# Patient Record
Sex: FEMALE | Race: WHITE | Employment: OTHER | URBAN - METROPOLITAN AREA
[De-identification: names, ages, dates, MRNs, and addresses within clinical notes are randomized per-mention and may not be internally consistent; named-entity substitution may affect disease eponyms.]

---

## 2019-08-10 ENCOUNTER — TRANSFERRED RECORDS (OUTPATIENT)
Dept: HEALTH INFORMATION MANAGEMENT | Facility: CLINIC | Age: 49
End: 2019-08-10

## 2019-08-10 ENCOUNTER — HOSPITAL ENCOUNTER (INPATIENT)
Facility: HOSPITAL | Age: 49
LOS: 4 days | Discharge: HOME | DRG: 885 | End: 2019-08-14
Attending: PSYCHIATRY & NEUROLOGY | Admitting: PSYCHIATRY & NEUROLOGY
Payer: COMMERCIAL

## 2019-08-10 DIAGNOSIS — F20.81 SCHIZOPHRENIFORM DISORDER (H): Primary | ICD-10-CM

## 2019-08-10 PROBLEM — F29 PSYCHOSIS (H): Status: ACTIVE | Noted: 2019-08-10

## 2019-08-10 LAB
ALT SERPL-CCNC: 25 IU/L (ref 6–31)
AST SERPL-CCNC: 28 IU/L (ref 10–40)
CREAT SERPL-MCNC: 0.79 MG/DL (ref 0.4–1)
GFR SERPL CREATININE-BSD FRML MDRD: >60 ML/MIN/1.73M2
GLUCOSE SERPL-MCNC: 92 MG/DL (ref 70–99)
POTASSIUM SERPL-SCNC: 4.4 MEQ/L (ref 3.4–5.1)
TSH SERPL-ACNC: 1.67 UIU/ML (ref 0.4–3.99)

## 2019-08-10 PROCEDURE — 12400000 ZZH R&B MH

## 2019-08-10 PROCEDURE — 25000132 ZZH RX MED GY IP 250 OP 250 PS 637: Performed by: NURSE PRACTITIONER

## 2019-08-10 RX ORDER — TRAZODONE HYDROCHLORIDE 50 MG/1
50 TABLET, FILM COATED ORAL
Status: DISCONTINUED | OUTPATIENT
Start: 2019-08-10 | End: 2019-08-14

## 2019-08-10 RX ORDER — ACETAMINOPHEN 325 MG/1
650 TABLET ORAL EVERY 4 HOURS PRN
Status: DISCONTINUED | OUTPATIENT
Start: 2019-08-10 | End: 2019-08-14 | Stop reason: HOSPADM

## 2019-08-10 RX ORDER — OLANZAPINE 10 MG/1
10 TABLET, ORALLY DISINTEGRATING ORAL 2 TIMES DAILY PRN
Status: DISCONTINUED | OUTPATIENT
Start: 2019-08-10 | End: 2019-08-11

## 2019-08-10 RX ORDER — HYDROXYZINE HYDROCHLORIDE 10 MG/1
30-50 TABLET, FILM COATED ORAL EVERY 4 HOURS PRN
Status: DISCONTINUED | OUTPATIENT
Start: 2019-08-10 | End: 2019-08-14 | Stop reason: HOSPADM

## 2019-08-10 RX ADMIN — OLANZAPINE 10 MG: 10 TABLET, ORALLY DISINTEGRATING ORAL at 22:26

## 2019-08-10 ASSESSMENT — ACTIVITIES OF DAILY LIVING (ADL)
COGNITION: 0 - NO COGNITION ISSUES REPORTED
FALL_HISTORY_WITHIN_LAST_SIX_MONTHS: NO
SWALLOWING: 0-->SWALLOWS FOODS/LIQUIDS WITHOUT DIFFICULTY
TRANSFERRING: 0-->INDEPENDENT
RETIRED_COMMUNICATION: 0-->UNDERSTANDS/COMMUNICATES WITHOUT DIFFICULTY
AMBULATION: 0-->INDEPENDENT
DRESS: 0-->INDEPENDENT
BATHING: 0-->INDEPENDENT
RETIRED_EATING: 0-->INDEPENDENT
TOILETING: 0-->INDEPENDENT

## 2019-08-11 PROCEDURE — 12400000 ZZH R&B MH

## 2019-08-11 PROCEDURE — 99223 1ST HOSP IP/OBS HIGH 75: CPT | Performed by: NURSE PRACTITIONER

## 2019-08-11 PROCEDURE — 25000132 ZZH RX MED GY IP 250 OP 250 PS 637: Performed by: NURSE PRACTITIONER

## 2019-08-11 RX ORDER — METHADONE HYDROCHLORIDE 5 MG/1
10 TABLET ORAL
Status: DISCONTINUED | OUTPATIENT
Start: 2019-08-11 | End: 2019-08-12 | Stop reason: ALTCHOICE

## 2019-08-11 RX ORDER — NALOXONE HYDROCHLORIDE 0.4 MG/ML
.1-.4 INJECTION, SOLUTION INTRAMUSCULAR; INTRAVENOUS; SUBCUTANEOUS
Status: DISCONTINUED | OUTPATIENT
Start: 2019-08-11 | End: 2019-08-14 | Stop reason: HOSPADM

## 2019-08-11 RX ORDER — OLANZAPINE 5 MG/1
5 TABLET, ORALLY DISINTEGRATING ORAL AT BEDTIME
Status: DISCONTINUED | OUTPATIENT
Start: 2019-08-11 | End: 2019-08-14

## 2019-08-11 RX ORDER — METHADONE HYDROCHLORIDE 5 MG/1
15 TABLET ORAL DAILY
Status: DISCONTINUED | OUTPATIENT
Start: 2019-08-11 | End: 2019-08-12 | Stop reason: ALTCHOICE

## 2019-08-11 RX ORDER — GABAPENTIN 600 MG/1
600 TABLET ORAL 3 TIMES DAILY
Status: DISCONTINUED | OUTPATIENT
Start: 2019-08-11 | End: 2019-08-13

## 2019-08-11 RX ORDER — METHADONE HYDROCHLORIDE 5 MG/1
10 TABLET ORAL EVERY 12 HOURS SCHEDULED
Status: DISCONTINUED | OUTPATIENT
Start: 2019-08-14 | End: 2019-08-12 | Stop reason: ALTCHOICE

## 2019-08-11 RX ADMIN — OLANZAPINE 5 MG: 5 TABLET, ORALLY DISINTEGRATING ORAL at 20:26

## 2019-08-11 RX ADMIN — GABAPENTIN 600 MG: 600 TABLET, FILM COATED ORAL at 13:47

## 2019-08-11 RX ADMIN — GABAPENTIN 600 MG: 600 TABLET, FILM COATED ORAL at 09:57

## 2019-08-11 RX ADMIN — GABAPENTIN 600 MG: 600 TABLET, FILM COATED ORAL at 20:27

## 2019-08-11 RX ADMIN — METHADONE HYDROCHLORIDE 10 MG: 5 TABLET ORAL at 20:26

## 2019-08-11 RX ADMIN — METHADONE HYDROCHLORIDE 15 MG: 5 TABLET ORAL at 09:57

## 2019-08-11 RX ADMIN — TRAZODONE HYDROCHLORIDE 50 MG: 50 TABLET ORAL at 20:30

## 2019-08-11 ASSESSMENT — ACTIVITIES OF DAILY LIVING (ADL)
DRESS: SCRUBS (BEHAVIORAL HEALTH)
ORAL_HYGIENE: INDEPENDENT
HYGIENE/GROOMING: INDEPENDENT
HYGIENE/GROOMING: INDEPENDENT
LAUNDRY: UNABLE TO COMPLETE
DRESS: SCRUBS (BEHAVIORAL HEALTH)
ORAL_HYGIENE: INDEPENDENT
LAUNDRY: UNABLE TO COMPLETE

## 2019-08-11 NOTE — PROGRESS NOTES
08/10/19 2252   Patient Belongings   Did you bring any home meds/supplements to the hospital?  No   Patient Belongings locker;sent to security per site process   Patient Belongings Put in Hospital Secure Location (Security or Locker, etc.) cash/credit card;clothing;jewelry;shoes   Belongings Search Yes   Clothing Search Yes   Second Staff jose   Comment $8 cash, 3 hair ties, pink hat, green bra, white shirt, jeans, grey hodded shirt, underwear, sunglasses, earrings (wearing), shoes, silver colored necklace, silver solored anklet   List items sent to safe: none    All other belongings put in assigned cubby in belongings room.     I have reviewed my belongings list on admission and verify that it is correct.     Patient signature_______________________________    Second staff witness (if patient unable to sign) ______________________________       I have received all my belongings at discharge.    Patient signature________________________________    Lisa  8/10/2019  10:54 PM
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
## 2019-08-11 NOTE — PLAN OF CARE
"  Problem: Adult Behavioral Health Plan of Care  Goal: Patient-Specific Goal (Individualization)  Description  1. Pt will compliant with treatment plan.   2. Pt will take meds as prescribed.  3. Pt will attend 50% of groups.     Patient isolative and withdrawn to her bed. Affect is full range, mood is calm. Admits to pain 10/10 in her back, states it is due to her RA, and that her medications she received this morning should help. Has been appropriate with staff and peers.   1230-patient hesitantly signed Voluntary Consent for treatment form, renée, states \"I just want to go home and be in my own bed and with my animals\". States she is here because her mom \"blew the things I said out of proportion, you know how mom's can be. I took care of her, she was in [employer] all of my senior year, and I had to take care of my brother and sister, and plan my sister's graduation party. She was in [employer] [facility] before, too. My mom and my sister are the crazy ones, my brother and I like to say that as a joke. I don't want to stay. These beds are uncomfortable, I usually sleep like a 'U' at home, they are hard on my back. That lady came in and said that I pull my hair out, I don't do that, it's just falling out in clumps when I comb through it. I'm so self conscious of my teeth, I just want to be home in my own bed and couch. And that carpet, I was just removing it slowly because it is under furniture and stuff, and look at my size, I can't just move things, so taking it out in pieces is easier\".    Face to face end of shift report communicated to Marixa WALKER RN.     Cherelle Duke  8/11/2019  3:14 PM          Outcome: No Change     Problem: Thought Process Alteration  Goal: Optimal Thought Clarity  Description  Pt will not be delusional by discharge.  She will be able to have a reality based conversation.       Patient has not made any delusional or paranoid statements.   Outcome: Improving     "

## 2019-08-11 NOTE — PLAN OF CARE
"ADMISSION NOTE    Reason for admission:  Patient unable to care for self \"they all think I'm crazy\"  Safety concerns :  Patient continues to pick at skin, states this has always been an issue..  Risk for or history of violence:  Patient has no current issues at this time.   Full skin assessment: minimal open reddened areas on hands and fingers from picking. No other red or open areas noted, patient has one tattoo on lower mid back, multiple scars from her many hip/back surgeries.       Patient arrived on unit from [facility] in [place] accompanied by security and transport staff on 8/10/2019  09:59 PM.   Status on arrival: calm, pleasant, cooperative  /81   Pulse 109   Temp 97.9  F (36.6  C) (Tympanic)   Resp 16   SpO2 100%   Patient given tour of unit and Welcome to  unit papers given to patient, wanding completed, belongings inventoried, and admission assessment completed.   Patient's legal status on arrival is transport hold. Appropriate legal rights discussed with and copy given to patient. Patient Bill of Rights discussed with and copy given to patient.   Patient denies SI, HI, and thoughts of self harm and contracts for safety while on unit.      Nohemy PRINCE Cameron  8/10/2019  11:06 PM    Patient very pleasant to talk with.  Reports that she has had multiple tests blood and stool to try and figure out the problem she is having \"It goes back a few years ago when I came home there was little black pieces, like pepper, all over the sink area in the kitchen, but I knew it wasn't pepper because they were moving.  I've taken multiple pictures and videos and no one believes me.  I believe it's a parasite of some kind from fecal matter transferred from my cat or dog.  I am the primary care taker of my animals and maybe since I have this condition where I pick my skin and some of the areas are open maybe that's how I contracted this, but nobody believes me that it's something.  My " "dog even has one coming out of the corner of his eye.  I suffer from RA really bad that is why I do not have my 2 front teeth, I always tell people this right away so they don't  me, I do not use drugs\"  \"I am in constant pain, but my Humira is expensive and the insurance needs to approve it.\"  Patient reports to having 23 surgeries, also that she smokes but not enough to need a patch or anything.      PRN:  Zyprexa 10 mg admin @ 2226 for anxiety.              "

## 2019-08-11 NOTE — PLAN OF CARE
Observed pt lying in a supine position - eyes closed - non-labored breathing noted.  Some restlessnesss noted with feet. Voices no complaints on first round.

## 2019-08-12 PROCEDURE — 25000132 ZZH RX MED GY IP 250 OP 250 PS 637: Performed by: NURSE PRACTITIONER

## 2019-08-12 PROCEDURE — 99232 SBSQ HOSP IP/OBS MODERATE 35: CPT | Performed by: NURSE PRACTITIONER

## 2019-08-12 PROCEDURE — 12400000 ZZH R&B MH

## 2019-08-12 RX ORDER — METHADONE HYDROCHLORIDE 5 MG/1
10 TABLET ORAL 3 TIMES DAILY
Status: DISCONTINUED | OUTPATIENT
Start: 2019-08-12 | End: 2019-08-12 | Stop reason: ALTCHOICE

## 2019-08-12 RX ORDER — METHADONE HYDROCHLORIDE 5 MG/1
15 TABLET ORAL DAILY
Status: COMPLETED | OUTPATIENT
Start: 2019-08-13 | End: 2019-08-13

## 2019-08-12 RX ORDER — METHADONE HYDROCHLORIDE 10 MG/1
10 TABLET ORAL 3 TIMES DAILY
Status: ON HOLD | COMMUNITY
End: 2019-08-14

## 2019-08-12 RX ORDER — METHADONE HYDROCHLORIDE 5 MG/1
10 TABLET ORAL 2 TIMES DAILY
Status: DISCONTINUED | OUTPATIENT
Start: 2019-08-14 | End: 2019-08-14 | Stop reason: HOSPADM

## 2019-08-12 RX ORDER — METHADONE HYDROCHLORIDE 5 MG/1
10 TABLET ORAL AT BEDTIME
Status: COMPLETED | OUTPATIENT
Start: 2019-08-12 | End: 2019-08-13

## 2019-08-12 RX ORDER — RISPERIDONE 0.5 MG/1
0.5 TABLET ORAL 2 TIMES DAILY
Status: ON HOLD | COMMUNITY
End: 2019-08-14

## 2019-08-12 RX ADMIN — TRAZODONE HYDROCHLORIDE 50 MG: 50 TABLET ORAL at 20:21

## 2019-08-12 RX ADMIN — METHADONE HYDROCHLORIDE 10 MG: 5 TABLET ORAL at 20:17

## 2019-08-12 RX ADMIN — GABAPENTIN 600 MG: 600 TABLET, FILM COATED ORAL at 13:41

## 2019-08-12 RX ADMIN — GABAPENTIN 600 MG: 600 TABLET, FILM COATED ORAL at 08:41

## 2019-08-12 RX ADMIN — METHADONE HYDROCHLORIDE 10 MG: 5 TABLET ORAL at 13:41

## 2019-08-12 RX ADMIN — METHADONE HYDROCHLORIDE 15 MG: 5 TABLET ORAL at 08:41

## 2019-08-12 RX ADMIN — GABAPENTIN 600 MG: 600 TABLET, FILM COATED ORAL at 20:18

## 2019-08-12 RX ADMIN — OLANZAPINE 5 MG: 5 TABLET, ORALLY DISINTEGRATING ORAL at 20:18

## 2019-08-12 ASSESSMENT — ACTIVITIES OF DAILY LIVING (ADL)
HYGIENE/GROOMING: INDEPENDENT
HYGIENE/GROOMING: INDEPENDENT
ORAL_HYGIENE: INDEPENDENT
DRESS: SCRUBS (BEHAVIORAL HEALTH);INDEPENDENT

## 2019-08-12 NOTE — PLAN OF CARE
Face to face end of shift will be report communicated to oncoming RN.      Problem: Adult Behavioral Health Plan of Care  Goal: Patient-Specific Goal (Individualization)  Description  1. Pt will compliant with treatment plan.   2. Pt will take meds as prescribed.  3. Pt will attend 50% of groups.     8/12/2019 0038 by Alison Santillan RN  Outcome: No Change     Problem: Thought Process Alteration  Goal: Optimal Thought Clarity  Description  Pt will not be delusional by discharge.  She will be able to have a reality based conversation.     8/12/2019 0038 by Alison Santillan, RN  Outcome: No Change   Pt appears to be sleeping in bed with eyes closed, having regular respirations and position changes. 15 minutes and PRN safety checks completed with no noted complains. Will continue to monitor.

## 2019-08-12 NOTE — PLAN OF CARE
"  Problem: Adult Behavioral Health Plan of Care  Goal: Patient-Specific Goal (Individualization)  Description  1. Pt will compliant with treatment plan.   2. Pt will take meds as prescribed.  3. Pt will attend 50% of groups.     8/11/2019 2213 by Marixa Ortega, RN  Outcome: No Change   Denies being suicidal.  Anxiety 6/10, denies depression, when asked about pain, states, \"yes always\".  Has a lot of things going on in her life right now, foreclosure of her home, missing front teeth, \"it's going to cost me $15,000 to get my teeth fix, and I use to have such a nice teeth\". Tearful at times.    Did ramble on about the different type of bugs her dog has had and the different ones that are in her house. Right now she said, \"we are dealing with carpet beetles\" \"I'm tearing out small pieces at a time because I've had so many back surgeries that I'm not strong enough to pull it all at once\".  \" works all day and is to tired to do it when he gets off\".   Isolates in room, does not attend groups. Does not socialize with peers.  Eating with no problems. Has difficulty sleeping on the beds here because of all the rods, plates and screws in her back.    Takes medications as prescribed.  Requested and received Trazodone 50 mg po at 2030.    Face to face end of shift report communicated to [facility] night shift RN.     Marixa Otrega  8/11/2019  11:20 PM        "

## 2019-08-12 NOTE — PLAN OF CARE
Problem: Adult Behavioral Health Plan of Care  Goal: Patient-Specific Goal (Individualization)  Description  1. Pt will compliant with treatment plan.   2. Pt will take meds as prescribed.  3. Pt will attend 50% of groups.     8/12/2019 1637 by Francine Schultz, RN  Outcome: No Change     Pt denies all criteria including SI, SIB, HI or hallucinations. Endorses depression and moderate anxiety. States she has had many stressors and losses in the last year including loss of her yellow lab-pt becomes tearful talking about him and a tree she planted.   Also states she is trying to get a three story house ready for sale. States she is ripping up carpeting in one room as if has carpet beetles (told to her by pest control). Pt states she notice what she thought was pepper in her sink. (Her sister had been staying there and she is not a clean person.) states she has an eros on her phone that magnifies things also uses contrast to see live things. She saw bugs. Also states her current dog is getting a recurrent eye infection. She looked at it with her phone eros and saw it was a bug.  Also states she is unsure if she and her  will get back together.    States she knows her mom is worried about her but feels she is not as depressed as her mom is making her to be.   Pt is pleasant and talkative during assessment.   Problem: Thought Process Alteration  Goal: Optimal Thought Clarity  Description  Pt will not be delusional by discharge.  She will be able to have a reality based conversation.     8/12/2019 1637 by Francine Schultz, RN  Outcome: No Change    Face to face end of shift report communicated to oncoming shift during end of shift report.     Francine Schultz  8/12/2019  10:28 PM

## 2019-08-12 NOTE — DISCHARGE INSTRUCTIONS
Behavioral Discharge Planning and Instructions    Summary: Thought Process Alteration    Main Diagnosis: delusions of parasitosis    Major Treatments, Procedures and Findings:Stabilize with medications, connect with community programs.    Symptoms to Report: feeling more aggressive, increased confusion, losing more sleep, mood getting worse or thoughts of suicide    Lifestyle Adjustment: Take all medications as prescribed, meet with doctor/ medication provider as needed. Abstain from alcohol or any unprescribed drugs.    Psychiatry Follow-up:   [facility] - Dr. Costa Rodriguez - is booked until October - will do follow up with someone in his care team -   Waleska Zee NP - Wednesday August 21 @ 2:15  [address] [place]  [address]  Phone - [phone]  Fax - [phone]    Resources:   Crisis Intervention: 603.970.5793 or 833-051-6168 (TTY: 364.170.6136).  Call anytime for help.  National [place] on Mental Illness (www.[place].nader.org): 661.773.7214 or 545-439-5689.  Alcoholics Anonymous (www.alcoholics-anonymous.org): Check your phone book for your local chapter.  Suicide Awareness Voices of Education (SAVE) (www.save.org): 034-070-YXUW (7457)  National Suicide Prevention Line (www.mentalhealthmn.org): 163-774-LTYY (9314)  Mental Health Consumer/Survivor Network of [place] (www.mhcsn.net): 709.300.8198 or 855-897-3404  Mental Health Association of [place] (www.mentalhealth.org): 924.972.3878 or 067-071-3193    General Medication Instructions:   See your medication sheet(s) for instructions.   Take all medicines as directed.  Make no changes unless your doctor suggests them.   Go to all your doctor visits.  Be sure to have all your required lab tests. This way, your medicines can be refilled on time.  Do not use any drugs not prescribed by your doctor.  Avoid alcohol.      [place] Area:  [facility], Crisis stabilization [facility]- 934.193.2982  [facility] Crisis Line: 1-481.362.8140  Advocates For Family Peace:  "153-6719  Sexual Assault Program of [facility]: 733.714.3828 or 1-498.898.1269  [place] [facility] Women[facility] Program: 1-348.830.1224 Ext: 279       Calls answered Mon-Fri-8:00 am--4:30 pm    [place]:  Advocates for Family Peace: 8-274-033-9533  [facility] - 1-810.362.5569  [facility] first call for help: 7-161-315-2415  [facility]:  (763) 406-4534      [place] Area:  Warm Line: 1-399.802.5526       Calls answered Tuesday--Saturday 4:00 pm--10:00 pm  [place] Crisis Line - 200.573.2672  [place] [facility] 510-064-8579    [place] Statewide:  [place] Crisis and Referral Services: 1-452.140.8757  National Suicide Prevention Lifeline: 2-462-209-TALK (1872)   - jrt9rciu- Text \"Life\" to 45683  First Call for Help: 2-1-1  [facility] Helpline- 1-791-SMLL-HELP    "

## 2019-08-12 NOTE — PROGRESS NOTES
CLINICAL NUTRITION SERVICES  -  ASSESSMENT NOTE    Ria Tejada : Admission Nutrition Risk Screen - reduced oral intake    49 yof admitted for psychosis. Limited medical hx available. No weight hx available, and no weight or height documented for this admission. Pt reported a 40lb weight loss, unable to confirm. Noted pt is missing some teeth, but is eating without difficulty.    Diet Order: Regular  Intake:0-100% of 4 meals documented. 2 at 0% and 2 at 100%    Height: Data Unavailable  Weight: 0 lbs 0 oz  There is no height or weight on file to calculate BMI.  Weight Status:  Unable to determine  IBW: unable to determine  Weight History: none available    Estimated Energy and Protein Needs: unable to calculate without height and weight    Malnutrition Diagnosis: Unable to determine due to limited information available    NUTRITION RECOMMENDATIONS  - Measure and document current height and weight  - Encourage intake  - May need to consider nutrition supplements with poor intake    MONITORING AND EVALUATION:  RD will monitor intake, weight, labs
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
## 2019-08-12 NOTE — PLAN OF CARE
BEHAVIORAL TEAM DISCUSSION    Participants: Isaac Blackwell NP, Brinda Leger LICSW, Sunitha Downing LSW,  Shannan Perry LSW, Macey Salamanca RN  Italo Rivers RN, Missy Ulloa RN,  Carolina Isidro OT, Cherry Enrique OT, Maria Elena Mccullough OT   Progress: Denies being suicidal.   Continued Stay Criteria/Rationale: Endorses anxiety. Psychosis.   Medical/Physical: Chronic back pain.   Precautions:   Falls precaution?: No  Behavioral Orders   Procedures    Code 1 - Restrict to Unit    Routine Programming     As clinically indicated    Status 15     Every 15 minutes.     Plan: Pharmacy was consulted on methadone taper, will clarify prior methadone order.  Questioning paracytosis.   Rationale for change in precautions or plan: none    Current Facility-Administered Medications:     acetaminophen (TYLENOL) tablet 650 mg, 650 mg, Oral, Q4H PRN, Keila Reddy APRN CNP    gabapentin (NEURONTIN) tablet 600 mg, 600 mg, Oral, TID, Keila Reddy APRN CNP, 600 mg at 08/12/19 0841    hydrOXYzine (ATARAX) tablet 30-50 mg, 30-50 mg, Oral, Q4H PRN, Keila Reddy APRN CNP    methadone (DOLOPHINE) tablet 10 mg, 10 mg, Oral, Daily at 8 pm, Keila Reddy APRN CNP, 10 mg at 08/11/19 2026    [START ON 8/14/2019] methadone (DOLOPHINE) tablet 10 mg, 10 mg, Oral, Q12H JOSÉ ANTONIO, Keila Reddy APRN CNP    methadone (DOLOPHINE) tablet 15 mg, 15 mg, Oral, Daily, Keila Reddy APRN CNP, 15 mg at 08/12/19 0841    naloxone (NARCAN) injection 0.1-0.4 mg, 0.1-0.4 mg, Intravenous, Q2 Min PRN, Keila Reddy APRN CNP    OLANZapine zydis (zyPREXA) ODT tab 5 mg, 5 mg, Oral, At Bedtime, Keila Reddy APRN CNP, 5 mg at 08/11/19 2026    traZODone (DESYREL) tablet 50 mg, 50 mg, Oral, At Bedtime PRN, Keila Reddy APRN CNP, 50 mg at 08/11/19 2030   Active Problems:    Psychosis (H)

## 2019-08-12 NOTE — PLAN OF CARE
Face to face end of shift report obtained from SUZANNA Calvin. Pt observed resting in bed.    SLIME PETTY  8/11/2019  11:49 PM

## 2019-08-12 NOTE — PLAN OF CARE
"Problem: Adult Behavioral Health Plan of Care  Goal: Patient-Specific Goal (Individualization)  Description  1. Pt will compliant with treatment plan.   2. Pt will take meds as prescribed.  3. Pt will attend 50% of groups.       Outcome: No Change  Pt has isolated to her room much of the shift. Did not attend any groups. Out in the dayroom for a short time, only. Pleasant and cooperative on assessment. Reported \"chronic RA pain\" in the lower back and bilateral hips. Receives scheduled Methadone TID. Denied depression and/or SI. Reported \"some\" anxiety, stating \"always do in the morning.\" Compliant with medications as prescribed. Pt ate 100% of breakfast meal and 50% of lunch meal. Slept approximately 6 hours last night.     Problem: Thought Process Alteration  Goal: Optimal Thought Clarity  Description  Pt will not be delusional by discharge.  She will be able to have a reality based conversation.     Outcome: No Change  Alert and oriented to person, place, time and situation. No delusional and/or paranoid type statements and/or behaviors.        "

## 2019-08-13 LAB
ALBUMIN SERPL-MCNC: 3.2 G/DL (ref 3.4–5)
ALP SERPL-CCNC: 77 U/L (ref 40–150)
ALT SERPL W P-5'-P-CCNC: 21 U/L (ref 0–50)
ANION GAP SERPL CALCULATED.3IONS-SCNC: 5 MMOL/L (ref 3–14)
AST SERPL W P-5'-P-CCNC: 13 U/L (ref 0–45)
BASOPHILS # BLD AUTO: 0.1 10E9/L (ref 0–0.2)
BASOPHILS NFR BLD AUTO: 0.8 %
BILIRUB SERPL-MCNC: 0.2 MG/DL (ref 0.2–1.3)
BUN SERPL-MCNC: 15 MG/DL (ref 7–30)
CALCIUM SERPL-MCNC: 8.9 MG/DL (ref 8.5–10.1)
CHLORIDE SERPL-SCNC: 105 MMOL/L (ref 94–109)
CO2 SERPL-SCNC: 30 MMOL/L (ref 20–32)
CREAT SERPL-MCNC: 0.64 MG/DL (ref 0.52–1.04)
DIFFERENTIAL METHOD BLD: ABNORMAL
EOSINOPHIL # BLD AUTO: 0.3 10E9/L (ref 0–0.7)
EOSINOPHIL NFR BLD AUTO: 3.2 %
ERYTHROCYTE [DISTWIDTH] IN BLOOD BY AUTOMATED COUNT: 14.4 % (ref 10–15)
GFR SERPL CREATININE-BSD FRML MDRD: >90 ML/MIN/{1.73_M2}
GLUCOSE SERPL-MCNC: 92 MG/DL (ref 70–99)
HCT VFR BLD AUTO: 34.5 % (ref 35–47)
HGB BLD-MCNC: 11.4 G/DL (ref 11.7–15.7)
IMM GRANULOCYTES # BLD: 0 10E9/L (ref 0–0.4)
IMM GRANULOCYTES NFR BLD: 0.1 %
LYMPHOCYTES # BLD AUTO: 4.9 10E9/L (ref 0.8–5.3)
LYMPHOCYTES NFR BLD AUTO: 55.6 %
MCH RBC QN AUTO: 31.7 PG (ref 26.5–33)
MCHC RBC AUTO-ENTMCNC: 33 G/DL (ref 31.5–36.5)
MCV RBC AUTO: 96 FL (ref 78–100)
MONOCYTES # BLD AUTO: 0.6 10E9/L (ref 0–1.3)
MONOCYTES NFR BLD AUTO: 6.6 %
NEUTROPHILS # BLD AUTO: 3 10E9/L (ref 1.6–8.3)
NEUTROPHILS NFR BLD AUTO: 33.7 %
NRBC # BLD AUTO: 0 10*3/UL
NRBC BLD AUTO-RTO: 0 /100
PLATELET # BLD AUTO: 353 10E9/L (ref 150–450)
POTASSIUM SERPL-SCNC: 3.7 MMOL/L (ref 3.4–5.3)
PROT SERPL-MCNC: 6.5 G/DL (ref 6.8–8.8)
RBC # BLD AUTO: 3.6 10E12/L (ref 3.8–5.2)
SODIUM SERPL-SCNC: 140 MMOL/L (ref 133–144)
WBC # BLD AUTO: 8.8 10E9/L (ref 4–11)

## 2019-08-13 PROCEDURE — 80053 COMPREHEN METABOLIC PANEL: CPT | Performed by: NURSE PRACTITIONER

## 2019-08-13 PROCEDURE — 25000132 ZZH RX MED GY IP 250 OP 250 PS 637: Performed by: NURSE PRACTITIONER

## 2019-08-13 PROCEDURE — 25000132 ZZH RX MED GY IP 250 OP 250 PS 637: Performed by: PSYCHIATRY & NEUROLOGY

## 2019-08-13 PROCEDURE — 12400000 ZZH R&B MH

## 2019-08-13 PROCEDURE — 85025 COMPLETE CBC W/AUTO DIFF WBC: CPT | Performed by: NURSE PRACTITIONER

## 2019-08-13 PROCEDURE — 36415 COLL VENOUS BLD VENIPUNCTURE: CPT | Performed by: NURSE PRACTITIONER

## 2019-08-13 RX ORDER — GABAPENTIN 600 MG/1
600 TABLET ORAL 3 TIMES DAILY
Status: DISCONTINUED | OUTPATIENT
Start: 2019-08-13 | End: 2019-08-14 | Stop reason: HOSPADM

## 2019-08-13 RX ADMIN — GABAPENTIN 600 MG: 600 TABLET, FILM COATED ORAL at 21:23

## 2019-08-13 RX ADMIN — METHADONE HYDROCHLORIDE 10 MG: 5 TABLET ORAL at 19:57

## 2019-08-13 RX ADMIN — TRAZODONE HYDROCHLORIDE 50 MG: 50 TABLET ORAL at 21:23

## 2019-08-13 RX ADMIN — OLANZAPINE 5 MG: 5 TABLET, ORALLY DISINTEGRATING ORAL at 21:23

## 2019-08-13 RX ADMIN — GABAPENTIN 600 MG: 600 TABLET, FILM COATED ORAL at 08:15

## 2019-08-13 RX ADMIN — GABAPENTIN 600 MG: 600 TABLET, FILM COATED ORAL at 14:54

## 2019-08-13 RX ADMIN — METHADONE HYDROCHLORIDE 15 MG: 5 TABLET ORAL at 08:14

## 2019-08-13 ASSESSMENT — ACTIVITIES OF DAILY LIVING (ADL)
DRESS: SCRUBS (BEHAVIORAL HEALTH)
DRESS: SCRUBS (BEHAVIORAL HEALTH);INDEPENDENT
ORAL_HYGIENE: INDEPENDENT
HYGIENE/GROOMING: INDEPENDENT
HYGIENE/GROOMING: INDEPENDENT
LAUNDRY: UNABLE TO COMPLETE
ORAL_HYGIENE: INDEPENDENT

## 2019-08-13 ASSESSMENT — MIFFLIN-ST. JEOR: SCORE: 983

## 2019-08-13 NOTE — PLAN OF CARE
BEHAVIORAL TEAM DISCUSSION    Participants: Isaac Blackwell NP, Brinda Leger LICSW, Sunitha Downing LSW, Shannan Perry LSW, Gaston Templeton LSW, Deisy Coker RN, Italo Rivers RN, Tiff Vivas [employer] [facility], Cherry Enrique OT, Maria Elena Mccullough OT  Progress: fair, poor insight, attending groups, bright  Continued Stay Criteria/Rationale: possible paracytosis, lingering delusions  Medical/Physical: chronic back pain  Precautions:   Falls precaution?: No  Behavioral Orders   Procedures    Code 1 - Restrict to Unit    Routine Programming     As clinically indicated    Status 15     Every 15 minutes.     Plan: weaning Methadone, continue to stabilize  Rationale for change in precautions or plan: None    Active Problems:    Psychosis (H)        Current Facility-Administered Medications:     acetaminophen (TYLENOL) tablet 650 mg, 650 mg, Oral, Q4H PRN, Keila Reddy APRN CNP    gabapentin (NEURONTIN) tablet 600 mg, 600 mg, Oral, TID, Keila Reddy APRN CNP, 600 mg at 08/13/19 0815    hydrOXYzine (ATARAX) tablet 30-50 mg, 30-50 mg, Oral, Q4H PRN, Keila Reddy APRN CNP    methadone (DOLOPHINE) tablet 10 mg, 10 mg, Oral, At Bedtime, Isaac Blackwell NP, 10 mg at 08/12/19 2017    [START ON 8/14/2019] methadone (DOLOPHINE) tablet 10 mg, 10 mg, Oral, BID, Isaac Blackwell NP    naloxone (NARCAN) injection 0.1-0.4 mg, 0.1-0.4 mg, Intravenous, Q2 Min PRN, Keila Reddy APRN CNP    OLANZapine zydis (zyPREXA) ODT tab 5 mg, 5 mg, Oral, At Bedtime, Keila Reddy APRN CNP, 5 mg at 08/12/19 2018    traZODone (DESYREL) tablet 50 mg, 50 mg, Oral, At Bedtime PRN, Keila Reddy APRN CNP, 50 mg at 08/12/19 2021

## 2019-08-13 NOTE — PLAN OF CARE
Face to face end of shift report obtained from SUZANNA Escamilla. Pt observed resting in bed.    SLIME PETTY  8/12/2019  11:58 PM

## 2019-08-13 NOTE — PLAN OF CARE
Problem: Thought Process Alteration  Goal: Optimal Thought Clarity  Description  Pt will not be delusional by discharge.  She will be able to have a reality based conversation.     8/13/2019 0847 by Cedric Willoughby, RN  Outcome: Improving    Pt denied seeing bugs and made no delusional statements       Problem: Adult Behavioral Health Plan of Care  Goal: Patient-Specific Goal (Individualization)  Description  1. Pt will compliant with treatment plan.   2. Pt will take meds as prescribed.  3. Pt will attend 50% of groups.     8/13/2019 0847 by Cedric Willoughby, RN  Outcome: Improving    0730 Face to face rounding complete.  Pt introduced to nursing for the shift.    Pt was up for meals and did spend some time in the dayroom socializing.  She did not attend groups until later in the shift.  She complained about feeling groggy after taking the PRN Trazodone last night. She also explained that she had not slept well for a long time before being admitted. She denied having any pain though said that her Gabapentin dose was supposed to be 1200 mg TID instead of the 600 mg.  Pt's provider was informed of this.  Pt did not talk about bugs or any other delusional content all shift.  Pt was hopeful that she would be discharged soon.      1500 Face to face end of shift report communicated to evening shift RN's along with fall risk.     Cedric Willoughby  8/13/2019

## 2019-08-13 NOTE — PLAN OF CARE
Face to face end of shift report will be communicated to oncoming RN.      Problem: Adult Behavioral Health Plan of Care  Goal: Patient-Specific Goal (Individualization)  Description  1. Pt will compliant with treatment plan.   2. Pt will take meds as prescribed.  3. Pt will attend 50% of groups.     8/13/2019 0600 by Alison Santillan RN  Outcome: No Change     Problem: Thought Process Alteration  Goal: Optimal Thought Clarity  Description  Pt will not be delusional by discharge.  She will be able to have a reality based conversation.     8/13/2019 0600 by Alison Santillan, RN  Outcome: No Change   Pt appears to be sleeping in bed with eyes closed, having regular respirations and position changes. 15 minutes and PRN safety checks completed with no noted complains. Will continue to monitor.   0705- Lab her. Blood sample obtained without difficulty, waiting for results.

## 2019-08-14 VITALS
HEART RATE: 100 BPM | RESPIRATION RATE: 18 BRPM | OXYGEN SATURATION: 97 % | WEIGHT: 113.32 LBS | BODY MASS INDEX: 26.22 KG/M2 | SYSTOLIC BLOOD PRESSURE: 102 MMHG | HEIGHT: 55 IN | TEMPERATURE: 98.1 F | DIASTOLIC BLOOD PRESSURE: 60 MMHG

## 2019-08-14 PROCEDURE — 93005 ELECTROCARDIOGRAM TRACING: CPT

## 2019-08-14 PROCEDURE — 93010 ELECTROCARDIOGRAM REPORT: CPT | Performed by: INTERNAL MEDICINE

## 2019-08-14 PROCEDURE — 25000132 ZZH RX MED GY IP 250 OP 250 PS 637: Performed by: NURSE PRACTITIONER

## 2019-08-14 PROCEDURE — 99239 HOSP IP/OBS DSCHRG MGMT >30: CPT | Performed by: NURSE PRACTITIONER

## 2019-08-14 PROCEDURE — 25000132 ZZH RX MED GY IP 250 OP 250 PS 637: Performed by: PSYCHIATRY & NEUROLOGY

## 2019-08-14 RX ORDER — ARIPIPRAZOLE 5 MG/1
5 TABLET ORAL AT BEDTIME
Qty: 30 TABLET | Refills: 0 | Status: SHIPPED | OUTPATIENT
Start: 2019-08-14

## 2019-08-14 RX ORDER — ARIPIPRAZOLE 5 MG/1
5 TABLET ORAL AT BEDTIME
Status: DISCONTINUED | OUTPATIENT
Start: 2019-08-14 | End: 2019-08-14 | Stop reason: HOSPADM

## 2019-08-14 RX ORDER — GABAPENTIN 600 MG/1
600 TABLET ORAL 3 TIMES DAILY
Qty: 90 TABLET | Refills: 0 | Status: SHIPPED | OUTPATIENT
Start: 2019-08-14

## 2019-08-14 RX ORDER — METHADONE HYDROCHLORIDE 10 MG/1
10 TABLET ORAL 2 TIMES DAILY
Refills: 0 | COMMUNITY
Start: 2019-08-14

## 2019-08-14 RX ADMIN — METHADONE HYDROCHLORIDE 10 MG: 5 TABLET ORAL at 09:00

## 2019-08-14 RX ADMIN — GABAPENTIN 600 MG: 600 TABLET, FILM COATED ORAL at 13:54

## 2019-08-14 RX ADMIN — GABAPENTIN 600 MG: 600 TABLET, FILM COATED ORAL at 08:59

## 2019-08-14 ASSESSMENT — ACTIVITIES OF DAILY LIVING (ADL)
LAUNDRY: UNABLE TO COMPLETE
ORAL_HYGIENE: INDEPENDENT
ORAL_HYGIENE: INDEPENDENT
HYGIENE/GROOMING: INDEPENDENT
DRESS: SCRUBS (BEHAVIORAL HEALTH);INDEPENDENT
DRESS: SCRUBS (BEHAVIORAL HEALTH)
HYGIENE/GROOMING: INDEPENDENT

## 2019-08-14 NOTE — PLAN OF CARE
Face to face end of shift report will be communicated to oncoming RN.      Problem: Adult Behavioral Health Plan of Care  Goal: Patient-Specific Goal (Individualization)  Description  1. Pt will compliant with treatment plan.   2. Pt will take meds as prescribed.  3. Pt will attend 50% of groups.     8/14/2019 0123 by Alison Santillan RN  Outcome: No Change     Problem: Thought Process Alteration  Goal: Optimal Thought Clarity  Description  Pt will not be delusional by discharge.  She will be able to have a reality based conversation.     8/14/2019 0123 by Alison Santillan, RN  Outcome: No Change   Pt appears to be sleeping in bed with eyes closed, having regular respirations and position changes. 15 minutes and PRN safety checks completed with no noted complains. Will continue to monitor.
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
## 2019-08-14 NOTE — PLAN OF CARE
Problem: Thought Process Alteration  Goal: Optimal Thought Clarity  Description  Pt will not be delusional by discharge.  She will be able to have a reality based conversation.     8/14/2019 0817 by Cedric Willoughby, RN  Outcome: Improving     Problem: Adult Behavioral Health Plan of Care  Goal: Patient-Specific Goal (Individualization)  Description  1. Pt will compliant with treatment plan.   2. Pt will take meds as prescribed.  3. Pt will attend 50% of groups.     8/14/2019 0817 by Cedric Willoughby, RN  Outcome: Improving    730 Face to face rounding complete.  Pt introduced to nursing for the shift.  Pt in room.     Pt was up this morning for breakfast and asked about leaving since her hold is up. She talked to me about the fleas and infections her dogs have had.  She told me that she thinks that she may have over reacted due to her being OCD. She denied having any hallucinations today.  Pt told me that she feels relaxed and that being here has let her catch up and her sleep. Pt is hopeful about discharge soon but is wondering how she will get home.  Pt was in bed a large part of the day with ear plugs in.  Pt was encouraged to go to groups and said that she may this evening.    Pt reported that her pain was controlled even with the lowered dose of Methadone.           Face to face end of shift report communicated to evening shift RN's along with Pt's fall risk.      Cedric Willoughby  8/14/2019  1500

## 2019-08-14 NOTE — PLAN OF CARE
Problem: Adult Behavioral Health Plan of Care  Goal: Patient-Specific Goal (Individualization)  Description  1. Pt will compliant with treatment plan.   2. Pt will take meds as prescribed.  3. Pt will attend 50% of groups.     8/14/2019 1835 by Francine Schultz, RN  Outcome: Completed   VSS, pain to lower back rated 5/10 pt states that is her baseline-declines need to tx. Denies all criteria including: SI, SIB, HI or hallucinations. States she has some anxiety but it is at a manageable level. Pt states she is looking forward to getting home and finishing packing. States she is unsure about her future with her .     Discharge Note    Patient Discharged to home on 8/14/2019 6:28PM via Private Car accompanied by Unit assistant and family friend.     Patient informed of discharge instructions in AVS. patient verbalizes understanding and denies having any questions pertaining to AVS. Patient stable at time of discharge. Patient denies SI, HI, and thoughts of self harm at time of discharge. All personal belongings returned to patient. Discharge prescriptions sent to [facility] in [place] via electronic communication.     Francine Schultz  8/14/2019  6:40 PM

## 2019-08-14 NOTE — PLAN OF CARE
Patient up and on the unit - reports she is feeling good - slept a bunch yesterday so didn't sleep as well last night - wondering when she can leave - encouraged her to follow up with her provider today - also reviewed her follow up medical appointment - she again states she really doesn't need any other services at this time.

## 2019-08-14 NOTE — PLAN OF CARE
Face to face end of shift report obtained from SUZANNA Escamilla. Pt observed resting in bed.    SLIME PETTY  8/14/2019  12:03 AM

## 2019-08-14 NOTE — PLAN OF CARE
BEHAVIORAL TEAM DISCUSSION     Participants: Vivian Kennedy NP, Brinda Leger LICSW, Sunitha Downing LSW, Shannan Perry LSW, Gaston Templeton LSW, Deisy Coker RN, Italo Rivers RN, Tiff [facility] [facility], Cherry Enrique OT, Carolina Isidro OT  Progress: fair, poor insight, attending groups, bright  Continued Stay Criteria/Rationale: possible paracytosis, lingering delusions  Medical/Physical: chronic back pain  Precautions:   Falls precaution?: No       Behavioral Orders   Procedures    Code 1 - Restrict to Unit    Routine Programming       As clinically indicated    Status 15       Every 15 minutes.      Plan: weaning Methadone, continue to stabilize, staff to get in contact with pt's , have pt sign neuroleptic consent, discharge home with follow up   Rationale for change in precautions or plan: None     Active Problems:    Psychosis (H)         Current Facility-Administered Medications:     acetaminophen (TYLENOL) tablet 650 mg, 650 mg, Oral, Q4H PRN, Keila Reddy APRN CNP    gabapentin (NEURONTIN) tablet 600 mg, 600 mg, Oral, TID, Keila Reddy APRN CNP, 600 mg at 08/13/19 0815    hydrOXYzine (ATARAX) tablet 30-50 mg, 30-50 mg, Oral, Q4H PRN, Keila Reddy APRN CNP    methadone (DOLOPHINE) tablet 10 mg, 10 mg, Oral, At Bedtime, Isaac Blackwell NP, 10 mg at 08/12/19 2017    [START ON 8/14/2019] methadone (DOLOPHINE) tablet 10 mg, 10 mg, Oral, BID, Isaac Blackwell NP    naloxone (NARCAN) injection 0.1-0.4 mg, 0.1-0.4 mg, Intravenous, Q2 Min PRN, Keila Reddy APRN CNP    OLANZapine zydis (zyPREXA) ODT tab 5 mg, 5 mg, Oral, At Bedtime, Keila Reddy APRN CNP, 5 mg at 08/12/19 2018    traZODone (DESYREL) tablet 50 mg, 50 mg, Oral, At Bedtime PRN, Keila Reddy APRN CNP, 50 mg at 08/12/19 2021
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
## 2019-09-08 DIAGNOSIS — F20.81 SCHIZOPHRENIFORM DISORDER (H): ICD-10-CM

## 2019-09-09 NOTE — TELEPHONE ENCOUNTER
ARIPiprazole (ABILIFY) 5 MG tablet    Last Written Prescription Date:  08/14/2019  Last Fill Quantity: 30,   # refills: 0  Last Office Visit: [facility] Was [facility] 08/10/2019  Future Office visit:       Routing refill request to provider for review/approval because:

## 2019-09-11 DIAGNOSIS — F20.81 SCHIZOPHRENIFORM DISORDER (H): ICD-10-CM

## 2019-09-11 RX ORDER — ARIPIPRAZOLE 5 MG/1
TABLET ORAL
Qty: 30 TABLET | Refills: 0 | OUTPATIENT
Start: 2019-09-11

## 2019-09-13 RX ORDER — GABAPENTIN 600 MG/1
600 TABLET ORAL 3 TIMES DAILY
Qty: 90 TABLET | Refills: 0 | OUTPATIENT
Start: 2019-09-13

## 2019-09-13 NOTE — TELEPHONE ENCOUNTER
gabapentin  Last Written Prescription Date: 8/14/19  Last Fill Quantity: 90 # of Refills: 0  Last Office Visit: 8/10/19    Pt has not been seen by a provider in our clinic, pt was on the behavioral health unit.

## 2020-02-24 ENCOUNTER — HEALTH MAINTENANCE LETTER (OUTPATIENT)
Age: 50
End: 2020-02-24

## 2020-12-13 ENCOUNTER — HEALTH MAINTENANCE LETTER (OUTPATIENT)
Age: 50
End: 2020-12-13

## 2021-04-17 ENCOUNTER — HEALTH MAINTENANCE LETTER (OUTPATIENT)
Age: 51
End: 2021-04-17

## 2021-09-26 ENCOUNTER — HEALTH MAINTENANCE LETTER (OUTPATIENT)
Age: 51
End: 2021-09-26

## 2022-01-16 ENCOUNTER — HEALTH MAINTENANCE LETTER (OUTPATIENT)
Age: 52
End: 2022-01-16

## 2022-05-08 ENCOUNTER — HEALTH MAINTENANCE LETTER (OUTPATIENT)
Age: 52
End: 2022-05-08

## 2023-04-23 ENCOUNTER — HEALTH MAINTENANCE LETTER (OUTPATIENT)
Age: 53
End: 2023-04-23

## 2023-06-02 ENCOUNTER — HEALTH MAINTENANCE LETTER (OUTPATIENT)
Age: 53
End: 2023-06-02